# Patient Record
Sex: MALE | URBAN - METROPOLITAN AREA
[De-identification: names, ages, dates, MRNs, and addresses within clinical notes are randomized per-mention and may not be internally consistent; named-entity substitution may affect disease eponyms.]

---

## 2024-07-31 ENCOUNTER — OFFICE VISIT (OUTPATIENT)
Dept: URBAN - METROPOLITAN AREA CLINIC 54 | Facility: CLINIC | Age: 54
End: 2024-07-31

## 2024-07-31 ENCOUNTER — DASHBOARD ENCOUNTERS (OUTPATIENT)
Age: 54
End: 2024-07-31

## 2024-07-31 VITALS
BODY MASS INDEX: 28.36 KG/M2 | WEIGHT: 202.6 LBS | HEART RATE: 85 BPM | DIASTOLIC BLOOD PRESSURE: 79 MMHG | TEMPERATURE: 98.2 F | SYSTOLIC BLOOD PRESSURE: 117 MMHG | HEIGHT: 71 IN

## 2024-07-31 PROBLEM — 64766004: Status: ACTIVE | Noted: 2024-07-31

## 2024-07-31 RX ORDER — PREDNISONE 10 MG/1
TAKE 4 TABS PO X 1 WEEK, TAKE 3 TABS PO X 1 WEEK, TAKE 2 TABS PO X 1 WEEK, TAKE 1 TAB PO X 1 WEEK TABLET ORAL ONCE A DAY
Status: ACTIVE | COMMUNITY

## 2024-07-31 RX ORDER — MIRTAZAPINE 15 MG/1
1 TABLET AT BEDTIME TABLET, FILM COATED ORAL ONCE A DAY
Status: ACTIVE | COMMUNITY

## 2024-07-31 RX ORDER — USTEKINUMAB 130 MG/26ML
AS DIRECTED SOLUTION INTRAVENOUS ONCE
OUTPATIENT
Start: 2024-07-31 | End: 2024-08-01

## 2024-07-31 RX ORDER — FERROUS SULFATE 325(65) MG
1 TABLET TABLET ORAL ONCE A DAY
Status: ACTIVE | COMMUNITY

## 2024-07-31 RX ORDER — OMEPRAZOLE 20 MG/1
1 CAPSULE 30 MINUTES BEFORE MORNING MEAL CAPSULE, DELAYED RELEASE ORAL ONCE A DAY
Status: ACTIVE | COMMUNITY

## 2024-07-31 RX ORDER — USTEKINUMAB 90 MG/ML
PRE-FILLED SYRINGE INJECTION, SOLUTION SUBCUTANEOUS
OUTPATIENT
Start: 2024-07-31

## 2024-07-31 NOTE — HPI-TODAY'S VISIT:
Venkat Oneil is a 53-year-old male patient with PMH of GERD, BPH, UC, who [facility]. A copy of this document will be sent to the referring provider. Patient presents for uncontrolled UC. He was previous seen by [facility] where he failed Entyvio. He started Stelara on 5/2022 and received two maintenance doses until he lost [employer] insurance. He reports he has been trying to get Stelara [facility] with [facility] since. He has been in and out of the ED since Jan 2024 for management of UC with steroids and remains on mesalamine 750 mg BID + budesonide 9mg. He complains of abdominal pain, rectal bleeding, and urgency. Work up during [facility] visit on 7/24 - 7/26 that showed anemia with hgb between 7-8 with iron def s/p IV iron transfusion, thrombocytosis, elevated CRP at 8.30. Negative GI pathogen profile with fecal ramón at >3000. Colonoscopy in 2010 with Dr. Olson for history of UC that noted pancolitis and normal-appearing ileum.  Biopsies showed mild active chronic colitis. Patient believes his last colonoscopy was ?one year ago.  CT A/P on 7/24/24 shows: CT findings of an acute, global, colitis with diffuse pericolonic inflammatory stranding and abnormal bowel wall thickening seen throughout the colon. These findings can be seen with infectious or inflammatory conditions.  Please exclude Clostridium as well as inflammatory bowel disease. No air or bowel wall pneumatosis is seen to suggest ischemia but correlation with lactic acid levels is recommended.  No pericolonic abscess or other complicating features are identified. Gallstones remain in the contracted gallbladder. Hypodensities in the spleen and bilateral kidneys are unchanged from prior and probably reflect benign lesions such as cysts.  This can be correlated with abdominal sonography for assurance. Numerous bilateral nonobstructing subcentimeter renal stones are identified.  No other changes seen.

## 2024-07-31 NOTE — PHYSICAL EXAM HENT:
Head,  normocephalic,  atraumatic,  Face,  Face within normal limits,  Ears,  External ears within normal limits,  Nose/Nasopharynx,  External nose  normal appearance, no nasal discharge,  Mouth and Throat,  Breath odor normal,  Lips,  Appearance normal 127

## 2024-08-22 ENCOUNTER — TELEPHONE ENCOUNTER (OUTPATIENT)
Dept: URBAN - METROPOLITAN AREA CLINIC 6 | Facility: CLINIC | Age: 54
End: 2024-08-22

## 2024-09-11 ENCOUNTER — OFFICE VISIT (OUTPATIENT)
Dept: URBAN - METROPOLITAN AREA CLINIC 54 | Facility: CLINIC | Age: 54
End: 2024-09-11
Payer: OTHER GOVERNMENT

## 2024-09-11 VITALS
BODY MASS INDEX: 28.14 KG/M2 | SYSTOLIC BLOOD PRESSURE: 127 MMHG | HEART RATE: 83 BPM | DIASTOLIC BLOOD PRESSURE: 82 MMHG | WEIGHT: 201 LBS | HEIGHT: 71 IN | TEMPERATURE: 97.2 F

## 2024-09-11 DIAGNOSIS — K51.80 CHRONIC PANCOLONIC ULCERATIVE COLITIS: ICD-10-CM

## 2024-09-11 DIAGNOSIS — Z80.0 FAMILY HISTORY OF COLON CANCER IN FATHER: ICD-10-CM

## 2024-09-11 PROCEDURE — 99214 OFFICE O/P EST MOD 30 MIN: CPT | Performed by: PHYSICIAN ASSISTANT

## 2024-09-11 RX ORDER — PREDNISONE 10 MG/1
TAKE 4 TABS PO X 1 WEEK, TAKE 3 TABS PO X 1 WEEK, TAKE 2 TABS PO X 1 WEEK, TAKE 1 TAB PO X 1 WEEK TABLET ORAL ONCE A DAY
Qty: 70 | Refills: 0
End: 2024-10-11

## 2024-09-11 RX ORDER — MIRTAZAPINE 15 MG/1
1 TABLET AT BEDTIME TABLET, FILM COATED ORAL ONCE A DAY
Status: ACTIVE | COMMUNITY

## 2024-09-11 RX ORDER — CIPROFLOXACIN HYDROCHLORIDE 500 MG/1
1 TABLET TABLET, FILM COATED ORAL
Status: ACTIVE | COMMUNITY

## 2024-09-11 RX ORDER — PREDNISONE 10 MG/1
TAKE 4 TABS PO X 1 WEEK, TAKE 3 TABS PO X 1 WEEK, TAKE 2 TABS PO X 1 WEEK, TAKE 1 TAB PO X 1 WEEK TABLET ORAL ONCE A DAY
Status: ACTIVE | COMMUNITY

## 2024-09-11 RX ORDER — METRONIDAZOLE 500 MG/1
1 TABLET TABLET ORAL THREE TIMES A DAY
Status: ACTIVE | COMMUNITY

## 2024-09-11 RX ORDER — ROPINIROLE HYDROCHLORIDE 1 MG/1
1 TABLET 1 TO 3 HOURS BEFORE BEDTIME TABLET, FILM COATED ORAL ONCE A DAY
Status: ACTIVE | COMMUNITY

## 2024-09-11 RX ORDER — TAMSULOSIN HYDROCHLORIDE 0.4 MG/1
1 CAPSULE CAPSULE ORAL ONCE A DAY
Status: ACTIVE | COMMUNITY

## 2024-09-11 RX ORDER — FERROUS SULFATE 325(65) MG
1 TABLET TABLET ORAL ONCE A DAY
Status: ACTIVE | COMMUNITY

## 2024-09-11 RX ORDER — PREDNISONE 20 MG/1
2 TABLET TABLET ORAL ONCE A DAY
Qty: 60 TABLET | Refills: 0 | OUTPATIENT
Start: 2024-09-11 | End: 2024-10-11

## 2024-09-11 RX ORDER — OMEPRAZOLE 20 MG/1
1 CAPSULE 30 MINUTES BEFORE MORNING MEAL CAPSULE, DELAYED RELEASE ORAL ONCE A DAY
Status: ACTIVE | COMMUNITY

## 2024-09-11 RX ORDER — MESALAMINE 375 MG/1
4 CAPSULES IN THE MORNING CAPSULE, EXTENDED RELEASE ORAL ONCE A DAY
Status: ACTIVE | COMMUNITY

## 2024-09-11 RX ORDER — USTEKINUMAB 90 MG/ML
PRE-FILLED SYRINGE INJECTION, SOLUTION SUBCUTANEOUS
Status: ACTIVE | COMMUNITY
Start: 2024-07-31

## 2024-09-11 NOTE — HPI-TODAY'S VISIT:
Venkat Oneil is a 53-year-old male patient with PMH of GERD, BPH, UC, who has [facility]. A copy of this document will be sent to the referring provider. Patient presents for uncontrolled UC. He was previous seen by [facility] where he failed Entyvio. He started Stelara on 5/2022 and received two maintenance doses until he lost [employer] insurance. He reports he has been trying to get Stelara approved with [facility] since. He has been in and out of the ED since Jan 2024 for management of UC with steroids and remains on mesalamine 750 mg BID + budesonide 9mg. He complains of abdominal pain, rectal bleeding, and urgency. Work up during [facility] visit on 7/24 - 7/26 that showed anemia with hgb between 7-8 with iron def s/p IV iron transfusion, thrombocytosis, elevated CRP at 8.30. Negative GI pathogen profile with fecal ramón at >3000. Colonoscopy in 2010 with Dr. Olson for history of UC that noted pancolitis and normal-appearing ileum.  Biopsies showed mild active chronic colitis. Patient believes his last colonoscopy was ?one year ago.  CT A/P on 7/24/24 shows: CT findings of an acute, global, colitis with diffuse pericolonic inflammatory stranding and abnormal bowel wall thickening seen throughout the colon. These findings can be seen with infectious or inflammatory conditions.  Please exclude Clostridium as well as inflammatory bowel disease. No air or bowel wall pneumatosis is seen to suggest ischemiabut correlation with lactic acid levels is recommended.  No pericolonic abscess or other complicating features are identified. Gallstones remain in the contracted gallbladder. Hypodensities in the spleen and bilateral kidneys are unchanged from prior and probably reflect benign lesions such as cysts.  This can be correlated with abdominal sonography for assurance. Numerous bilateral nonobstructing subcentimeter renal stones are identified.  No other changes seen.  9/11/24: Patient presents for hospital follow up. Patient was seen in the ED 3 days ago at [facility] for RUQ pain. CT A/P on 9/9 that shows: 1. Findings most consistent with acute exacerbation of UC 2.Nonobstructing bilateral nephroliths. 3. Spleen contains soft tissue nodule with cystic center at the right margin of the spleen measuring 5.2 x 5 x 5.5 cm.  Finding is unchanged when compared to the exam from May 13, 2024.  Finding likely represents a benign etiology in the absence of known primary malignancy.  Recommend follow-up imaging to ensure stability in 6 months. 4. Cholelithiasis with no imaging findings of acute cholecystitis.   Pt was given 7 days of prednisone as well as cipro + flagyl. Normal WBC and procalcitonin. We [facility] to [facility] last OV, but this was denied. Attempted to call the [facility] during the [facility] and was on the phone for 22 mins and the line was disconnected.

## 2024-09-23 ENCOUNTER — TELEPHONE ENCOUNTER (OUTPATIENT)
Dept: RURAL CLINIC 2 | Facility: CLINIC | Age: 54
End: 2024-09-23

## 2024-09-26 ENCOUNTER — TELEPHONE ENCOUNTER (OUTPATIENT)
Dept: URBAN - METROPOLITAN AREA CLINIC 54 | Facility: CLINIC | Age: 54
End: 2024-09-26

## 2024-09-26 RX ORDER — INFLIXIMAB 100 MG/10ML
AS DIRECTED INJECTION, POWDER, LYOPHILIZED, FOR SOLUTION INTRAVENOUS
Qty: 1 | Refills: 6 | OUTPATIENT
Start: 2024-09-26 | End: 2025-10-23

## 2024-09-26 RX ORDER — INFLIXIMAB 100 MG/10ML
AS DIRECTED INJECTION, POWDER, LYOPHILIZED, FOR SOLUTION INTRAVENOUS
OUTPATIENT
Start: 2024-09-26

## 2024-10-03 ENCOUNTER — TELEPHONE ENCOUNTER (OUTPATIENT)
Dept: URBAN - METROPOLITAN AREA CLINIC 54 | Facility: CLINIC | Age: 54
End: 2024-10-03

## 2024-10-14 ENCOUNTER — TELEPHONE ENCOUNTER (OUTPATIENT)
Dept: URBAN - METROPOLITAN AREA CLINIC 54 | Facility: CLINIC | Age: 54
End: 2024-10-14

## 2024-10-16 ENCOUNTER — OFFICE VISIT (OUTPATIENT)
Dept: URBAN - METROPOLITAN AREA CLINIC 54 | Facility: CLINIC | Age: 54
End: 2024-10-16
Payer: OTHER GOVERNMENT

## 2024-10-16 VITALS
SYSTOLIC BLOOD PRESSURE: 126 MMHG | DIASTOLIC BLOOD PRESSURE: 73 MMHG | HEIGHT: 71 IN | WEIGHT: 206.8 LBS | BODY MASS INDEX: 28.95 KG/M2 | HEART RATE: 88 BPM | TEMPERATURE: 98.4 F

## 2024-10-16 DIAGNOSIS — Z80.0 FAMILY HISTORY OF COLON CANCER IN FATHER: ICD-10-CM

## 2024-10-16 DIAGNOSIS — K51.90 ULCERATIVE COLITIS WITHOUT COMPLICATIONS, UNSPECIFIED LOCATION: ICD-10-CM

## 2024-10-16 PROCEDURE — 99213 OFFICE O/P EST LOW 20 MIN: CPT | Performed by: PHYSICIAN ASSISTANT

## 2024-10-16 RX ORDER — MIRTAZAPINE 15 MG/1
1 TABLET AT BEDTIME TABLET, FILM COATED ORAL ONCE A DAY
Status: ACTIVE | COMMUNITY

## 2024-10-16 RX ORDER — MESALAMINE 375 MG/1
4 CAPSULES IN THE MORNING CAPSULE, EXTENDED RELEASE ORAL ONCE A DAY
Status: ACTIVE | COMMUNITY

## 2024-10-16 RX ORDER — ROPINIROLE HYDROCHLORIDE 1 MG/1
1 TABLET 1 TO 3 HOURS BEFORE BEDTIME TABLET, FILM COATED ORAL ONCE A DAY
Status: ACTIVE | COMMUNITY

## 2024-10-16 RX ORDER — CIPROFLOXACIN HYDROCHLORIDE 500 MG/1
1 TABLET TABLET, FILM COATED ORAL
Status: ACTIVE | COMMUNITY

## 2024-10-16 RX ORDER — TAMSULOSIN HYDROCHLORIDE 0.4 MG/1
1 CAPSULE CAPSULE ORAL ONCE A DAY
Status: ACTIVE | COMMUNITY

## 2024-10-16 RX ORDER — FERROUS SULFATE 325(65) MG
1 TABLET TABLET ORAL ONCE A DAY
Status: ACTIVE | COMMUNITY

## 2024-10-16 RX ORDER — OMEPRAZOLE 20 MG/1
1 CAPSULE 30 MINUTES BEFORE MORNING MEAL CAPSULE, DELAYED RELEASE ORAL ONCE A DAY
Status: ACTIVE | COMMUNITY

## 2024-10-16 RX ORDER — METRONIDAZOLE 500 MG/1
1 TABLET TABLET ORAL THREE TIMES A DAY
Status: ACTIVE | COMMUNITY

## 2024-10-16 RX ORDER — INFLIXIMAB 100 MG/10ML
AS DIRECTED INJECTION, POWDER, LYOPHILIZED, FOR SOLUTION INTRAVENOUS
Status: ACTIVE | COMMUNITY
Start: 2024-09-26

## 2024-10-16 RX ORDER — INFLIXIMAB 100 MG/10ML
AS DIRECTED INJECTION, POWDER, LYOPHILIZED, FOR SOLUTION INTRAVENOUS
Qty: 1 | Refills: 6 | Status: ACTIVE | COMMUNITY
Start: 2024-09-26 | End: 2025-10-23

## 2024-10-16 RX ORDER — USTEKINUMAB 90 MG/ML
PRE-FILLED SYRINGE INJECTION, SOLUTION SUBCUTANEOUS
Status: ACTIVE | COMMUNITY
Start: 2024-07-31

## 2024-10-16 NOTE — HPI-TODAY'S VISIT:
Venkat Oneil is a 53-year-old male patient with PMH of GERD, BPH, UC, who has [facility]. A copy of this document will be sent to the referring provider. Patient presents for uncontrolled UC. He was previous seen by [facility] where he failed Entyvio. He started Stelara on 5/2022 and received two maintenance doses until he lost [employer] insurance. He reports he has been trying to get Stelara approved with [facility] since. He has been in and out of the ED since Jan 2024 for management of UC with steroids and remains on mesalamine 750 mg BID + budesonide 9mg. He complains of abdominal pain, rectal bleeding, and urgency. Work up during [facility] visit on 7/24 - 7/26 that showed anemia with hgb between 7-8 with iron def s/p IV iron transfusion, thrombocytosis, elevated CRP at 8.30. Negative GI pathogen profile with fecal ramón at >3000. Colonoscopy in 2010 with Dr. Olson for history of UC that noted pancolitis and normal-appearing ileum.  Biopsies showed mild active chronic colitis. Patient believes his last colonoscopy was ?one year ago.  CT A/P on 7/24/24 shows: CT findings of an acute, global, colitis with diffuse pericolonic inflammatory stranding and abnormal bowel wall thickening seen throughout the colon. These findings can be seen with infectious or inflammatory conditions.  Please exclude Clostridium as well as inflammatory bowel disease. No air or bowel wall pneumatosis is seen to suggest ischemiabut correlation with lactic acid levels is recommended.  No pericolonic abscess or other complicating features are identified. Gallstones remain in the contracted gallbladder. Hypodensities in the spleen and bilateral kidneys are unchanged from prior and probably reflect benign lesions such as cysts.  This can be correlated with abdominal sonography for assurance. Numerous bilateral nonobstructing subcentimeter renal stones are identified.  No other changes seen.  9/11/24: Patient presents for hospital follow up. Patient was seen in the ED 3 days ago at [facility] for RUQ pain. CT A/P on 9/9 that shows: 1. Findings most consistent with acute exacerbation of UC 2.Nonobstructing bilateral nephroliths. 3. Spleen contains soft tissue nodule with cystic center at the right margin of the spleen measuring 5.2 x 5 x 5.5 cm.  Finding is unchanged when compared to the exam from May 13, 2024.  Finding likely represents a benign etiology in the absence of known primary malignancy.  Recommend follow-up imaging to ensure stability in 6 months. 4. Cholelithiasis with no imaging findings of acute cholecystitis.   Pt was given 7 days of prednisone as well as cipro + flagyl. Normal WBC and procalcitonin. We attempted to send Stelara ourselves last OV, but this was denied. [facility]ed to call the [facility] during the [facility] and was on the phone for 22 mins and the line was disconnected.  10/16/24: Pt presents for routine follow up. Unable to get Stelara. Waiting for approval of Remicade and to start infusion. Tapering down on Prednisone. 3-4 BMs daily on average which was improved approx 6 BMs daily. Daily rectal bleeding. Good improvement in abdominal pain.

## 2024-11-20 ENCOUNTER — OFFICE VISIT (OUTPATIENT)
Dept: URBAN - METROPOLITAN AREA CLINIC 54 | Facility: CLINIC | Age: 54
End: 2024-11-20

## 2024-11-20 ENCOUNTER — TELEPHONE ENCOUNTER (OUTPATIENT)
Dept: URBAN - METROPOLITAN AREA CLINIC 82 | Facility: CLINIC | Age: 54
End: 2024-11-20

## 2024-11-20 RX ORDER — OMEPRAZOLE 20 MG/1
1 CAPSULE 30 MINUTES BEFORE MORNING MEAL CAPSULE, DELAYED RELEASE ORAL ONCE A DAY
Status: ACTIVE | COMMUNITY

## 2024-11-20 RX ORDER — INFLIXIMAB 100 MG/10ML
AS DIRECTED INJECTION, POWDER, LYOPHILIZED, FOR SOLUTION INTRAVENOUS
Qty: 1 | Refills: 6 | Status: ACTIVE | COMMUNITY
Start: 2024-09-26 | End: 2025-10-23

## 2024-11-20 RX ORDER — ROPINIROLE HYDROCHLORIDE 1 MG/1
1 TABLET 1 TO 3 HOURS BEFORE BEDTIME TABLET, FILM COATED ORAL ONCE A DAY
Status: ACTIVE | COMMUNITY

## 2024-11-20 RX ORDER — PREDNISONE 20 MG/1
1 TABLET TABLET ORAL TWICE DAILY
Status: ACTIVE | COMMUNITY

## 2024-11-20 RX ORDER — INFLIXIMAB 100 MG/10ML
AS DIRECTED INJECTION, POWDER, LYOPHILIZED, FOR SOLUTION INTRAVENOUS
Status: ACTIVE | COMMUNITY
Start: 2024-09-26

## 2024-11-20 RX ORDER — MIRTAZAPINE 15 MG/1
1 TABLET AT BEDTIME TABLET, FILM COATED ORAL ONCE A DAY
Status: ACTIVE | COMMUNITY

## 2024-11-20 RX ORDER — USTEKINUMAB 90 MG/ML
PRE-FILLED SYRINGE INJECTION, SOLUTION SUBCUTANEOUS
Status: ACTIVE | COMMUNITY
Start: 2024-07-31

## 2024-11-20 RX ORDER — METRONIDAZOLE 500 MG/1
1 TABLET TABLET ORAL THREE TIMES A DAY
Status: ACTIVE | COMMUNITY

## 2024-11-20 RX ORDER — CIPROFLOXACIN HYDROCHLORIDE 500 MG/1
1 TABLET TABLET, FILM COATED ORAL
Status: ACTIVE | COMMUNITY

## 2024-11-20 RX ORDER — TAMSULOSIN HYDROCHLORIDE 0.4 MG/1
1 CAPSULE CAPSULE ORAL ONCE A DAY
Status: ACTIVE | COMMUNITY

## 2024-11-20 RX ORDER — MESALAMINE 375 MG/1
4 CAPSULES IN THE MORNING CAPSULE, EXTENDED RELEASE ORAL ONCE A DAY
Status: ACTIVE | COMMUNITY

## 2024-11-20 RX ORDER — FERROUS SULFATE 325(65) MG
1 TABLET TABLET ORAL ONCE A DAY
Status: ACTIVE | COMMUNITY

## 2024-11-26 ENCOUNTER — TELEPHONE ENCOUNTER (OUTPATIENT)
Dept: URBAN - METROPOLITAN AREA CLINIC 82 | Facility: CLINIC | Age: 54
End: 2024-11-26

## 2024-11-26 ENCOUNTER — TELEPHONE ENCOUNTER (OUTPATIENT)
Dept: URBAN - METROPOLITAN AREA CLINIC 6 | Facility: CLINIC | Age: 54
End: 2024-11-26

## 2024-11-27 ENCOUNTER — OFFICE VISIT (OUTPATIENT)
Dept: URBAN - METROPOLITAN AREA CLINIC 54 | Facility: CLINIC | Age: 54
End: 2024-11-27
Payer: OTHER GOVERNMENT

## 2024-11-27 VITALS
HEIGHT: 71 IN | SYSTOLIC BLOOD PRESSURE: 138 MMHG | WEIGHT: 198.4 LBS | TEMPERATURE: 97.9 F | HEART RATE: 93 BPM | DIASTOLIC BLOOD PRESSURE: 91 MMHG | BODY MASS INDEX: 27.77 KG/M2

## 2024-11-27 DIAGNOSIS — K51.90 ULCERATIVE COLITIS WITHOUT COMPLICATIONS, UNSPECIFIED LOCATION: ICD-10-CM

## 2024-11-27 DIAGNOSIS — Z80.0 FAMILY HISTORY OF COLON CANCER IN FATHER: ICD-10-CM

## 2024-11-27 PROCEDURE — 99214 OFFICE O/P EST MOD 30 MIN: CPT | Performed by: PHYSICIAN ASSISTANT

## 2024-11-27 RX ORDER — L. ACIDOPHILUS/L.BULGARICUS 1MM CELL
AS DIRECTED TABLET ORAL
Status: ACTIVE | COMMUNITY

## 2024-11-27 RX ORDER — OXYCODONE HYDROCHLORIDE AND ACETAMINOPHEN 5; 325 MG/1; MG/1
1 TABLET AS NEEDED TABLET ORAL
Status: ACTIVE | COMMUNITY

## 2024-11-27 RX ORDER — INFLIXIMAB 100 MG/10ML
AS DIRECTED INJECTION, POWDER, LYOPHILIZED, FOR SOLUTION INTRAVENOUS
Qty: 1 | Refills: 6 | Status: ACTIVE | COMMUNITY
Start: 2024-09-26 | End: 2025-10-23

## 2024-11-27 RX ORDER — PREDNISONE 20 MG/1
1 TABLET TABLET ORAL TWICE DAILY
Status: ACTIVE | COMMUNITY

## 2024-11-27 RX ORDER — OMEPRAZOLE AND SODIUM BICARBONATE 40; 1100 MG/1; MG/1
1 CAPSULE ON AN EMPTY STOMACH CAPSULE ORAL ONCE A DAY
Status: ACTIVE | COMMUNITY

## 2024-11-27 RX ORDER — TAMSULOSIN HYDROCHLORIDE 0.4 MG/1
1 CAPSULE CAPSULE ORAL ONCE A DAY
Status: ACTIVE | COMMUNITY

## 2024-11-27 RX ORDER — INFLIXIMAB 100 MG/10ML
AS DIRECTED INJECTION, POWDER, LYOPHILIZED, FOR SOLUTION INTRAVENOUS
Status: ACTIVE | COMMUNITY
Start: 2024-09-26

## 2024-11-27 RX ORDER — ONDANSETRON 4 MG/1
1 TABLET ON THE TONGUE AND ALLOW TO DISSOLVE TABLET, ORALLY DISINTEGRATING ORAL ONCE A DAY
Status: ACTIVE | COMMUNITY

## 2024-11-27 RX ORDER — PREDNISONE 10 MG/1
TAKE 4 TABS PO X 1 WEEK, TAKE 3 TABS PO X 1 WEEK, TAKE 2 TABS PO X 1 WEEK, TAKE 1 TAB PO X 1 WEEK TABLET ORAL ONCE A DAY
Qty: 70 | Refills: 0
End: 2024-12-27

## 2024-11-27 RX ORDER — FERROUS SULFATE 325(65) MG
1 TABLET TABLET ORAL ONCE A DAY
Status: ACTIVE | COMMUNITY

## 2024-11-27 RX ORDER — MESALAMINE 375 MG/1
4 CAPSULES IN THE MORNING CAPSULE, EXTENDED RELEASE ORAL ONCE A DAY
Status: ACTIVE | COMMUNITY

## 2024-11-27 RX ORDER — MIRTAZAPINE 15 MG/1
1 TABLET AT BEDTIME TABLET, FILM COATED ORAL ONCE A DAY
Status: ACTIVE | COMMUNITY

## 2024-11-27 RX ORDER — ROPINIROLE HYDROCHLORIDE 1 MG/1
1 TABLET 1 TO 3 HOURS BEFORE BEDTIME TABLET, FILM COATED ORAL ONCE A DAY
Status: ACTIVE | COMMUNITY

## 2024-11-27 RX ORDER — POTASSIUM CITRATE 10 MEQ/1
1 TABLET WITH MEALS TABLET, EXTENDED RELEASE ORAL THREE TIMES A DAY
Status: ACTIVE | COMMUNITY

## 2024-11-27 RX ORDER — PREDNISONE 20 MG/1
2 TABLET TABLET ORAL ONCE A DAY
Qty: 60 TABLET | Refills: 0
Start: 2024-09-11 | End: 2024-12-27

## 2024-11-27 NOTE — HPI-TODAY'S VISIT:
Venkat Oneil is a 53-year-old male patient with PMH of GERD, BPH, UC, who has [facility]. A copy of this document will be sent to the referring provider. Patient presents for uncontrolled UC. He was previous seen by [facility] where he failed Entyvio. He started Stelara on 5/2022 and received two maintenance doses until he lost [employer] insurance. He reports he has been trying to get Stelara approved with the [facility] since. He has been in and out of the ED since Jan 2024 for management of UC with steroids and remains on mesalamine 750 mg BID + budesonide 9mg. He complains of abdominal pain, rectal bleeding, and urgency. Work up during [facility] visit on 7/24 - 7/26 that showed anemia with hgb between 7-8 with iron def s/p IV iron transfusion, thrombocytosis, elevated CRP at 8.30. Negative GI pathogen profile with fecal ramón at >3000. Colonoscopy in 2010 with Dr. Olson for history of UC that noted pancolitis and normal-appearing ileum.  Biopsies showed mild active chronic colitis. Patient believes his last colonoscopy was ?one year ago.  CT A/P on 7/24/24 shows: CT findings of an acute, global, colitis with diffuse pericolonic inflammatory stranding and abnormal bowel wall thickening seen throughout the colon. These findings can be seen with infectious or inflammatory conditions.  Please exclude Clostridium as well as inflammatory bowel disease. No air or bowel wall pneumatosis is seen to suggest ischemiabut correlation with lactic acid levels is recommended.  No pericolonic abscess or other complicating features are identified. Gallstones remain in the contracted gallbladder. Hypodensities in the spleen and bilateral kidneys are unchanged from prior and probably reflect benign lesions such as cysts.  This can be correlated with abdominal sonography for assurance. Numerous bilateral nonobstructing subcentimeter renal stones are identified.  No other changes seen.  9/11/24: Patient presents for hospital follow up. Patient was seen in the ED 3 days ago at [facility] for RUQ pain. CT A/P on 9/9 that shows: 1. Findings most consistent with acute exacerbation of UC 2.Nonobstructing bilateral nephroliths. 3. Spleen contains soft tissue nodule with cystic center at the right margin of the spleen measuring 5.2 x 5 x 5.5 cm.  Finding is unchanged when compared to the exam from May 13, 2024.  Finding likely represents a benign etiology in the absence of known primary malignancy.  Recommend follow-up imaging to ensure stability in 6 months. 4. Cholelithiasis with no imaging findings of acute cholecystitis.   Pt was given 7 days of prednisone as well as cipro + flagyl. Normal WBC and procalcitonin. We attempted to send Stelara ourselves last OV, but this was denied. Attempted to call the [facility] during the [facility] and was on the phone for 22 mins and the line was disconnected.  10/16/24: Pt presents for routine follow up. Unable to get Stelara. Waiting for approval of Remicade and to start infusion. Tapering down on Prednisone. 3-4 BMs daily on average which was improved approx 6 BMs daily. Daily rectal bleeding. Good improvement in abdominal pain.  11/27/24: Patient presents for routine follow-up of ulcerative colitis.  Patient has tapered off of steroids and was seen in the ED twice.  Once for recurrent kidney stone and the second time was for UC flare.  He is currently on a 10-day course of prednisone.  Mild bleeding noted.  He has been approved for Remicade and there have been difficulty scheduling the patient.

## 2024-12-12 ENCOUNTER — OFFICE VISIT (OUTPATIENT)
Dept: URBAN - METROPOLITAN AREA CLINIC 53 | Facility: CLINIC | Age: 54
End: 2024-12-12

## 2024-12-18 ENCOUNTER — OFFICE VISIT (OUTPATIENT)
Dept: URBAN - METROPOLITAN AREA CLINIC 54 | Facility: CLINIC | Age: 54
End: 2024-12-18

## 2024-12-18 RX ORDER — MESALAMINE 375 MG/1
4 CAPSULES IN THE MORNING CAPSULE, EXTENDED RELEASE ORAL ONCE A DAY
Status: ACTIVE | COMMUNITY

## 2024-12-18 RX ORDER — ROPINIROLE HYDROCHLORIDE 1 MG/1
1 TABLET 1 TO 3 HOURS BEFORE BEDTIME TABLET, FILM COATED ORAL ONCE A DAY
Status: ACTIVE | COMMUNITY

## 2024-12-18 RX ORDER — INFLIXIMAB 100 MG/10ML
AS DIRECTED INJECTION, POWDER, LYOPHILIZED, FOR SOLUTION INTRAVENOUS
Status: ACTIVE | COMMUNITY
Start: 2024-09-26

## 2024-12-18 RX ORDER — OMEPRAZOLE AND SODIUM BICARBONATE 40; 1100 MG/1; MG/1
1 CAPSULE ON AN EMPTY STOMACH CAPSULE ORAL ONCE A DAY
Status: ACTIVE | COMMUNITY

## 2024-12-18 RX ORDER — MIRTAZAPINE 15 MG/1
1 TABLET AT BEDTIME TABLET, FILM COATED ORAL ONCE A DAY
Status: ACTIVE | COMMUNITY

## 2024-12-18 RX ORDER — PREDNISONE 20 MG/1
1 TABLET TABLET ORAL TWICE DAILY
Status: ACTIVE | COMMUNITY

## 2024-12-18 RX ORDER — L. ACIDOPHILUS/L.BULGARICUS 1MM CELL
AS DIRECTED TABLET ORAL
Status: ACTIVE | COMMUNITY

## 2024-12-18 RX ORDER — OXYCODONE HYDROCHLORIDE AND ACETAMINOPHEN 5; 325 MG/1; MG/1
1 TABLET AS NEEDED TABLET ORAL
Status: ACTIVE | COMMUNITY

## 2024-12-18 RX ORDER — FERROUS SULFATE 325(65) MG
1 TABLET TABLET ORAL ONCE A DAY
Status: ACTIVE | COMMUNITY

## 2024-12-18 RX ORDER — PREDNISONE 10 MG/1
TAKE 4 TABS PO X 1 WEEK, TAKE 3 TABS PO X 1 WEEK, TAKE 2 TABS PO X 1 WEEK, TAKE 1 TAB PO X 1 WEEK TABLET ORAL ONCE A DAY
Qty: 70 | Refills: 0 | Status: ACTIVE | COMMUNITY
End: 2024-12-27

## 2024-12-18 RX ORDER — POTASSIUM CITRATE 10 MEQ/1
1 TABLET WITH MEALS TABLET, EXTENDED RELEASE ORAL THREE TIMES A DAY
Status: ACTIVE | COMMUNITY

## 2024-12-18 RX ORDER — TAMSULOSIN HYDROCHLORIDE 0.4 MG/1
1 CAPSULE CAPSULE ORAL ONCE A DAY
Status: ACTIVE | COMMUNITY

## 2024-12-18 RX ORDER — INFLIXIMAB 100 MG/10ML
AS DIRECTED INJECTION, POWDER, LYOPHILIZED, FOR SOLUTION INTRAVENOUS
Qty: 1 | Refills: 6 | Status: ACTIVE | COMMUNITY
Start: 2024-09-26 | End: 2025-10-23

## 2024-12-18 RX ORDER — ONDANSETRON 4 MG/1
1 TABLET ON THE TONGUE AND ALLOW TO DISSOLVE TABLET, ORALLY DISINTEGRATING ORAL ONCE A DAY
Status: ACTIVE | COMMUNITY

## 2024-12-18 RX ORDER — PREDNISONE 20 MG/1
2 TABLET TABLET ORAL ONCE A DAY
Qty: 60 TABLET | Refills: 0 | Status: ACTIVE | COMMUNITY
Start: 2024-09-11 | End: 2024-12-27

## 2025-01-16 ENCOUNTER — OFFICE VISIT (OUTPATIENT)
Dept: URBAN - METROPOLITAN AREA CLINIC 53 | Facility: CLINIC | Age: 55
End: 2025-01-16
Payer: OTHER GOVERNMENT

## 2025-01-16 VITALS
TEMPERATURE: 97.2 F | DIASTOLIC BLOOD PRESSURE: 78 MMHG | BODY MASS INDEX: 31.58 KG/M2 | SYSTOLIC BLOOD PRESSURE: 144 MMHG | WEIGHT: 225.6 LBS | HEIGHT: 71 IN

## 2025-01-16 DIAGNOSIS — K51.90 ACUTE ULCERATIVE COLITIS: ICD-10-CM

## 2025-01-16 PROCEDURE — 96413 CHEMO IV INFUSION 1 HR: CPT | Performed by: INTERNAL MEDICINE

## 2025-01-16 PROCEDURE — 96415 CHEMO IV INFUSION ADDL HR: CPT | Performed by: INTERNAL MEDICINE

## 2025-01-16 RX ORDER — FERROUS SULFATE 325(65) MG
1 TABLET TABLET ORAL ONCE A DAY
Status: ACTIVE | COMMUNITY

## 2025-01-16 RX ORDER — MESALAMINE 375 MG/1
4 CAPSULES IN THE MORNING CAPSULE, EXTENDED RELEASE ORAL ONCE A DAY
Status: ACTIVE | COMMUNITY

## 2025-01-16 RX ORDER — OXYCODONE HYDROCHLORIDE AND ACETAMINOPHEN 5; 325 MG/1; MG/1
1 TABLET AS NEEDED TABLET ORAL
Status: ACTIVE | COMMUNITY

## 2025-01-16 RX ORDER — L. ACIDOPHILUS/L.BULGARICUS 1MM CELL
AS DIRECTED TABLET ORAL
Status: ACTIVE | COMMUNITY

## 2025-01-16 RX ORDER — TAMSULOSIN HYDROCHLORIDE 0.4 MG/1
1 CAPSULE CAPSULE ORAL ONCE A DAY
Status: ACTIVE | COMMUNITY

## 2025-01-16 RX ORDER — ONDANSETRON 4 MG/1
1 TABLET ON THE TONGUE AND ALLOW TO DISSOLVE TABLET, ORALLY DISINTEGRATING ORAL ONCE A DAY
Status: ACTIVE | COMMUNITY

## 2025-01-16 RX ORDER — ROPINIROLE HYDROCHLORIDE 1 MG/1
1 TABLET 1 TO 3 HOURS BEFORE BEDTIME TABLET, FILM COATED ORAL ONCE A DAY
Status: ACTIVE | COMMUNITY

## 2025-01-16 RX ORDER — POTASSIUM CITRATE 10 MEQ/1
1 TABLET WITH MEALS TABLET, EXTENDED RELEASE ORAL THREE TIMES A DAY
Status: ACTIVE | COMMUNITY

## 2025-01-16 RX ORDER — INFLIXIMAB 100 MG/10ML
AS DIRECTED INJECTION, POWDER, LYOPHILIZED, FOR SOLUTION INTRAVENOUS
Status: ACTIVE | COMMUNITY
Start: 2024-09-26

## 2025-01-16 RX ORDER — OMEPRAZOLE AND SODIUM BICARBONATE 40; 1100 MG/1; MG/1
1 CAPSULE ON AN EMPTY STOMACH CAPSULE ORAL ONCE A DAY
Status: ACTIVE | COMMUNITY

## 2025-01-16 RX ORDER — PREDNISONE 20 MG/1
1 TABLET TABLET ORAL TWICE DAILY
Status: ACTIVE | COMMUNITY

## 2025-01-16 RX ORDER — INFLIXIMAB 100 MG/10ML
AS DIRECTED INJECTION, POWDER, LYOPHILIZED, FOR SOLUTION INTRAVENOUS
Qty: 1 | Refills: 6 | Status: ACTIVE | COMMUNITY
Start: 2024-09-26 | End: 2025-10-23

## 2025-01-16 RX ORDER — MIRTAZAPINE 15 MG/1
1 TABLET AT BEDTIME TABLET, FILM COATED ORAL ONCE A DAY
Status: ACTIVE | COMMUNITY

## 2025-01-30 ENCOUNTER — OFFICE VISIT (OUTPATIENT)
Dept: URBAN - METROPOLITAN AREA CLINIC 53 | Facility: CLINIC | Age: 55
End: 2025-01-30
Payer: OTHER GOVERNMENT

## 2025-01-30 VITALS
DIASTOLIC BLOOD PRESSURE: 82 MMHG | WEIGHT: 220.6 LBS | BODY MASS INDEX: 30.88 KG/M2 | HEIGHT: 71 IN | SYSTOLIC BLOOD PRESSURE: 139 MMHG | TEMPERATURE: 98.1 F

## 2025-01-30 DIAGNOSIS — K51.90 ULCERATIVE COLITIS WITHOUT COMPLICATIONS, UNSPECIFIED LOCATION: ICD-10-CM

## 2025-01-30 PROCEDURE — 96413 CHEMO IV INFUSION 1 HR: CPT | Performed by: INTERNAL MEDICINE

## 2025-01-30 PROCEDURE — 96415 CHEMO IV INFUSION ADDL HR: CPT | Performed by: INTERNAL MEDICINE

## 2025-01-30 RX ORDER — OMEPRAZOLE AND SODIUM BICARBONATE 40; 1100 MG/1; MG/1
1 CAPSULE ON AN EMPTY STOMACH CAPSULE ORAL ONCE A DAY
Status: ACTIVE | COMMUNITY

## 2025-01-30 RX ORDER — MIRTAZAPINE 15 MG/1
1 TABLET AT BEDTIME TABLET, FILM COATED ORAL ONCE A DAY
Status: ACTIVE | COMMUNITY

## 2025-01-30 RX ORDER — ROPINIROLE HYDROCHLORIDE 1 MG/1
1 TABLET 1 TO 3 HOURS BEFORE BEDTIME TABLET, FILM COATED ORAL ONCE A DAY
Status: ACTIVE | COMMUNITY

## 2025-01-30 RX ORDER — OXYCODONE HYDROCHLORIDE AND ACETAMINOPHEN 5; 325 MG/1; MG/1
1 TABLET AS NEEDED TABLET ORAL
Status: ACTIVE | COMMUNITY

## 2025-01-30 RX ORDER — MESALAMINE 375 MG/1
4 CAPSULES IN THE MORNING CAPSULE, EXTENDED RELEASE ORAL ONCE A DAY
Status: ACTIVE | COMMUNITY

## 2025-01-30 RX ORDER — INFLIXIMAB 100 MG/10ML
AS DIRECTED INJECTION, POWDER, LYOPHILIZED, FOR SOLUTION INTRAVENOUS
Status: ACTIVE | COMMUNITY
Start: 2024-09-26

## 2025-01-30 RX ORDER — FERROUS SULFATE 325(65) MG
1 TABLET TABLET ORAL ONCE A DAY
Status: ACTIVE | COMMUNITY

## 2025-01-30 RX ORDER — ONDANSETRON 4 MG/1
1 TABLET ON THE TONGUE AND ALLOW TO DISSOLVE TABLET, ORALLY DISINTEGRATING ORAL ONCE A DAY
Status: ACTIVE | COMMUNITY

## 2025-01-30 RX ORDER — PREDNISONE 20 MG/1
1 TABLET TABLET ORAL TWICE DAILY
Status: ACTIVE | COMMUNITY

## 2025-01-30 RX ORDER — TAMSULOSIN HYDROCHLORIDE 0.4 MG/1
1 CAPSULE CAPSULE ORAL ONCE A DAY
Status: ACTIVE | COMMUNITY

## 2025-01-30 RX ORDER — L. ACIDOPHILUS/L.BULGARICUS 1MM CELL
AS DIRECTED TABLET ORAL
Status: ACTIVE | COMMUNITY

## 2025-01-30 RX ORDER — POTASSIUM CITRATE 10 MEQ/1
1 TABLET WITH MEALS TABLET, EXTENDED RELEASE ORAL THREE TIMES A DAY
Status: ACTIVE | COMMUNITY

## 2025-01-30 RX ORDER — INFLIXIMAB 100 MG/10ML
AS DIRECTED INJECTION, POWDER, LYOPHILIZED, FOR SOLUTION INTRAVENOUS
Qty: 1 | Refills: 6 | Status: ACTIVE | COMMUNITY
Start: 2024-09-26 | End: 2025-10-23

## 2025-02-27 ENCOUNTER — OFFICE VISIT (OUTPATIENT)
Dept: URBAN - METROPOLITAN AREA CLINIC 53 | Facility: CLINIC | Age: 55
End: 2025-02-27
Payer: OTHER GOVERNMENT

## 2025-02-27 VITALS
HEART RATE: 92 BPM | RESPIRATION RATE: 20 BRPM | DIASTOLIC BLOOD PRESSURE: 80 MMHG | SYSTOLIC BLOOD PRESSURE: 158 MMHG | WEIGHT: 220.4 LBS | TEMPERATURE: 97.9 F | BODY MASS INDEX: 30.85 KG/M2 | HEIGHT: 71 IN

## 2025-02-27 DIAGNOSIS — K51.90 ULCERATIVE COLITIS WITHOUT COMPLICATIONS, UNSPECIFIED LOCATION: ICD-10-CM

## 2025-02-27 PROCEDURE — 96413 CHEMO IV INFUSION 1 HR: CPT | Performed by: INTERNAL MEDICINE

## 2025-02-27 PROCEDURE — 96415 CHEMO IV INFUSION ADDL HR: CPT | Performed by: INTERNAL MEDICINE

## 2025-02-27 RX ORDER — ROPINIROLE HYDROCHLORIDE 1 MG/1
1 TABLET 1 TO 3 HOURS BEFORE BEDTIME TABLET, FILM COATED ORAL ONCE A DAY
Status: ACTIVE | COMMUNITY

## 2025-02-27 RX ORDER — OMEPRAZOLE AND SODIUM BICARBONATE 40; 1100 MG/1; MG/1
1 CAPSULE ON AN EMPTY STOMACH CAPSULE ORAL ONCE A DAY
Status: ACTIVE | COMMUNITY

## 2025-02-27 RX ORDER — OXYCODONE HYDROCHLORIDE AND ACETAMINOPHEN 5; 325 MG/1; MG/1
1 TABLET AS NEEDED TABLET ORAL
Status: ACTIVE | COMMUNITY

## 2025-02-27 RX ORDER — ONDANSETRON 4 MG/1
1 TABLET ON THE TONGUE AND ALLOW TO DISSOLVE TABLET, ORALLY DISINTEGRATING ORAL ONCE A DAY
Status: ACTIVE | COMMUNITY

## 2025-02-27 RX ORDER — L. ACIDOPHILUS/L.BULGARICUS 1MM CELL
AS DIRECTED TABLET ORAL
Status: ACTIVE | COMMUNITY

## 2025-02-27 RX ORDER — MESALAMINE 375 MG/1
4 CAPSULES IN THE MORNING CAPSULE, EXTENDED RELEASE ORAL ONCE A DAY
Status: ACTIVE | COMMUNITY

## 2025-02-27 RX ORDER — POTASSIUM CITRATE 10 MEQ/1
1 TABLET WITH MEALS TABLET, EXTENDED RELEASE ORAL THREE TIMES A DAY
Status: ACTIVE | COMMUNITY

## 2025-02-27 RX ORDER — PREDNISONE 20 MG/1
1 TABLET TABLET ORAL TWICE DAILY
Status: ACTIVE | COMMUNITY

## 2025-02-27 RX ORDER — FERROUS SULFATE 325(65) MG
1 TABLET TABLET ORAL ONCE A DAY
Status: ACTIVE | COMMUNITY

## 2025-02-27 RX ORDER — TAMSULOSIN HYDROCHLORIDE 0.4 MG/1
1 CAPSULE CAPSULE ORAL ONCE A DAY
Status: ACTIVE | COMMUNITY

## 2025-02-27 RX ORDER — MIRTAZAPINE 15 MG/1
1 TABLET AT BEDTIME TABLET, FILM COATED ORAL ONCE A DAY
Status: ACTIVE | COMMUNITY

## 2025-02-27 RX ORDER — INFLIXIMAB 100 MG/10ML
AS DIRECTED INJECTION, POWDER, LYOPHILIZED, FOR SOLUTION INTRAVENOUS
Status: ACTIVE | COMMUNITY
Start: 2024-09-26

## 2025-02-27 RX ORDER — INFLIXIMAB 100 MG/10ML
AS DIRECTED INJECTION, POWDER, LYOPHILIZED, FOR SOLUTION INTRAVENOUS
Qty: 1 | Refills: 6 | Status: ACTIVE | COMMUNITY
Start: 2024-09-26 | End: 2025-10-23

## 2025-04-09 ENCOUNTER — OFFICE VISIT (OUTPATIENT)
Dept: URBAN - METROPOLITAN AREA CLINIC 54 | Facility: CLINIC | Age: 55
End: 2025-04-09
Payer: OTHER GOVERNMENT

## 2025-04-09 DIAGNOSIS — K51.90 ULCERATIVE COLITIS WITHOUT COMPLICATIONS, UNSPECIFIED LOCATION: ICD-10-CM

## 2025-04-09 DIAGNOSIS — Z80.0 FAMILY HISTORY OF COLON CANCER IN FATHER: ICD-10-CM

## 2025-04-09 PROCEDURE — 99214 OFFICE O/P EST MOD 30 MIN: CPT | Performed by: PHYSICIAN ASSISTANT

## 2025-04-09 RX ORDER — MIRTAZAPINE 15 MG/1
1 TABLET AT BEDTIME TABLET, FILM COATED ORAL ONCE A DAY
Status: ACTIVE | COMMUNITY

## 2025-04-09 RX ORDER — INFLIXIMAB 100 MG/10ML
AS DIRECTED INJECTION, POWDER, LYOPHILIZED, FOR SOLUTION INTRAVENOUS
Status: ACTIVE | COMMUNITY
Start: 2024-09-26

## 2025-04-09 RX ORDER — ROPINIROLE HYDROCHLORIDE 1 MG/1
1 TABLET 1 TO 3 HOURS BEFORE BEDTIME TABLET, FILM COATED ORAL ONCE A DAY
Status: ACTIVE | COMMUNITY

## 2025-04-09 RX ORDER — POTASSIUM CITRATE 10 MEQ/1
1 TABLET WITH MEALS TABLET, EXTENDED RELEASE ORAL THREE TIMES A DAY
Status: ACTIVE | COMMUNITY

## 2025-04-09 RX ORDER — MESALAMINE 375 MG/1
4 CAPSULES IN THE MORNING CAPSULE, EXTENDED RELEASE ORAL ONCE A DAY
Status: ACTIVE | COMMUNITY

## 2025-04-09 RX ORDER — OMEPRAZOLE AND SODIUM BICARBONATE 40; 1100 MG/1; MG/1
1 CAPSULE ON AN EMPTY STOMACH CAPSULE ORAL ONCE A DAY
Status: ACTIVE | COMMUNITY

## 2025-04-09 RX ORDER — FERROUS SULFATE 325(65) MG
1 TABLET TABLET ORAL ONCE A DAY
Status: ACTIVE | COMMUNITY

## 2025-04-09 RX ORDER — PREDNISONE 20 MG/1
1 TABLET TABLET ORAL TWICE DAILY
Status: ON HOLD | COMMUNITY

## 2025-04-09 RX ORDER — OXYCODONE HYDROCHLORIDE AND ACETAMINOPHEN 5; 325 MG/1; MG/1
1 TABLET AS NEEDED TABLET ORAL
Status: ACTIVE | COMMUNITY

## 2025-04-09 RX ORDER — TAMSULOSIN HYDROCHLORIDE 0.4 MG/1
1 CAPSULE CAPSULE ORAL ONCE A DAY
Status: ACTIVE | COMMUNITY

## 2025-04-09 RX ORDER — INFLIXIMAB 100 MG/10ML
AS DIRECTED INJECTION, POWDER, LYOPHILIZED, FOR SOLUTION INTRAVENOUS
Qty: 1 | Refills: 6 | Status: ACTIVE | COMMUNITY
Start: 2024-09-26 | End: 2025-10-23

## 2025-04-09 RX ORDER — L. ACIDOPHILUS/L.BULGARICUS 1MM CELL
AS DIRECTED TABLET ORAL
Status: ACTIVE | COMMUNITY

## 2025-04-09 RX ORDER — ONDANSETRON 4 MG/1
1 TABLET ON THE TONGUE AND ALLOW TO DISSOLVE TABLET, ORALLY DISINTEGRATING ORAL ONCE A DAY
Status: ACTIVE | COMMUNITY

## 2025-04-09 NOTE — HPI-TODAY'S VISIT:
Venkat Oneil is a 53-year-old male patient with PMH of GERD, BPH, UC, who has [facility]. A copy of this document will be sent to the referring provider. Patient presents for uncontrolled UC. He was previous seen by [facility] where he failed Entyvio. He started Stelara on 5/2022 and received two maintenance doses until he lost [employer] insurance. He reports he has been trying to get Stelara approved with the [facility] since. He has been in and out of the ED since Jan 2024 for management of UC with steroids and remains on mesalamine 750 mg BID + budesonide 9mg. He complains of abdominal pain, rectal bleeding, and urgency. Work up during [facility] visit on 7/24 - 7/26 that showed anemia with hgb between 7-8 with iron def s/p IV iron transfusion, thrombocytosis, elevated CRP at 8.30. Negative GI pathogen profile with fecal ramón at >3000. Colonoscopy in 2010 with Dr. Olson for history of UC that noted pancolitis and normal-appearing ileum.  Biopsies showed mild active chronic colitis. Patient believes his last colonoscopy was ?one year ago.  CT A/P on 7/24/24 shows: CT findings of an acute, global, colitis with diffuse pericolonic inflammatory stranding and abnormal bowel wall thickening seen throughout the colon. These findings can be seen with infectious or inflammatory conditions.  Please exclude Clostridium as well as inflammatory bowel disease. No air or bowel wall pneumatosis is seen to suggest ischemiabut correlation with lactic acid levels is recommended.  No pericolonic abscess or other complicating features are identified. Gallstones remain in the contracted gallbladder. Hypodensities in the spleen and bilateral kidneys are unchanged from prior and probably reflect benign lesions such as cysts.  This can be correlated with abdominal sonography for assurance. Numerous bilateral nonobstructing subcentimeter renal stones are identified.  No other changes seen.  9/11/24: Patient presents for hospital follow up. Patient was seen in the ED 3 days ago at [facility] for RUQ pain. CT A/P on 9/9 that shows: 1. Findings most consistent with acute exacerbation of UC 2.Nonobstructing bilateral nephroliths. 3. Spleen contains soft tissue nodule with cystic center at the right margin of the spleen measuring 5.2 x 5 x 5.5 cm.  Finding is unchanged when compared to the exam from May 13, 2024.  Finding likely represents a benign etiology in the absence of known primary malignancy.  Recommend follow-up imaging to ensure stability in 6 months. 4. Cholelithiasis with no imaging findings of acute cholecystitis.   Pt was given 7 days of prednisone as well as cipro + flagyl. Normal WBC and procalcitonin. We attempted to send Stelara ourselves last OV, but this was denied. [facility]ed to call the [facility] during the [facility] and was on the phone for 22 mins and the line was disconnected.  10/16/24: Pt presents for routine follow up. Unable to get Stelara. Waiting for approval of Remicade and to start infusion. Tapering down on Prednisone. 3-4 BMs daily on average which was improved approx 6 BMs daily. Daily rectal bleeding. Good improvement in abdominal pain.  11/27/24: Patient presents for routine follow-up of ulcerative colitis.  Patient has tapered off of steroids and was seen in the ED twice.  Once for recurrent kidney stone and the second time was for UC flare.  He is currently on a 10-day course of prednisone.  Mild bleeding noted.  He has been approved for Remicade and there have been difficulty scheduling the patient.  4/9/25: Patient has completed steroid taper and induction of infliximab 5 mg/kg.  He has his first maintenance dose on 4/24. Patient had mild bleeding last week with 24 hour viral illness. Patient with approx 3-4 BMs daily. No abdominal pain.

## 2025-04-10 LAB
A/G RATIO: 1.5
ABSOLUTE BASOPHILS: 71
ABSOLUTE EOSINOPHILS: 205
ABSOLUTE LYMPHOCYTES: 1793
ABSOLUTE MONOCYTES: 624
ABSOLUTE NEUTROPHILS: 5206
ALBUMIN: 4.7
ALKALINE PHOSPHATASE: 80
ALT (SGPT): 12
AST (SGOT): 18
BASOPHILS: 0.9
BILIRUBIN, TOTAL: 0.5
BUN/CREATININE RATIO: (no result)
BUN: 9
CALCIUM: 9.9
CARBON DIOXIDE, TOTAL: 22
CHLORIDE: 104
CREATININE: 0.97
EGFR: 93
EOSINOPHILS: 2.6
FERRITIN, SERUM: 7
GLOBULIN, TOTAL: 3.1
GLUCOSE: 78
HEMATOCRIT: 41.7
HEMOGLOBIN: 12.6
IRON BIND.CAP.(TIBC): 383
IRON SATURATION: 6
IRON: 24
LYMPHOCYTES: 22.7
MCH: 23.6
MCHC: 30.2
MCV: 78.2
MONOCYTES: 7.9
MPV: 10.4
NEUTROPHILS: 65.9
PLATELET COUNT: 639
POTASSIUM: 3.8
PROTEIN, TOTAL: 7.8
RDW: 15.5
RED BLOOD CELL COUNT: 5.33
SODIUM: 140
WHITE BLOOD CELL COUNT: 7.9

## 2025-04-11 ENCOUNTER — TELEPHONE ENCOUNTER (OUTPATIENT)
Dept: URBAN - METROPOLITAN AREA CLINIC 54 | Facility: CLINIC | Age: 55
End: 2025-04-11

## 2025-04-11 PROBLEM — 724556004: Status: ACTIVE | Noted: 2025-04-11

## 2025-04-11 RX ORDER — FERRIC CARBOXYMALTOSE INJECTION 50 MG/ML
AS DIRECTED INJECTION, SOLUTION INTRAVENOUS
OUTPATIENT
Start: 2025-04-11 | End: 2025-04-25

## 2025-04-24 ENCOUNTER — OFFICE VISIT (OUTPATIENT)
Dept: URBAN - METROPOLITAN AREA CLINIC 53 | Facility: CLINIC | Age: 55
End: 2025-04-24
Payer: OTHER GOVERNMENT

## 2025-04-24 DIAGNOSIS — K51.90 ULCERATIVE COLITIS WITHOUT COMPLICATIONS, UNSPECIFIED LOCATION: ICD-10-CM

## 2025-04-24 PROCEDURE — 96415 CHEMO IV INFUSION ADDL HR: CPT | Performed by: INTERNAL MEDICINE

## 2025-04-24 PROCEDURE — 96413 CHEMO IV INFUSION 1 HR: CPT | Performed by: INTERNAL MEDICINE

## 2025-04-24 RX ORDER — MESALAMINE 375 MG/1
4 CAPSULES IN THE MORNING CAPSULE, EXTENDED RELEASE ORAL ONCE A DAY
Status: ACTIVE | COMMUNITY

## 2025-04-24 RX ORDER — FERROUS SULFATE 325(65) MG
1 TABLET TABLET ORAL ONCE A DAY
Status: ACTIVE | COMMUNITY

## 2025-04-24 RX ORDER — INFLIXIMAB 100 MG/10ML
AS DIRECTED INJECTION, POWDER, LYOPHILIZED, FOR SOLUTION INTRAVENOUS
Status: ACTIVE | COMMUNITY
Start: 2024-09-26

## 2025-04-24 RX ORDER — ONDANSETRON 4 MG/1
1 TABLET ON THE TONGUE AND ALLOW TO DISSOLVE TABLET, ORALLY DISINTEGRATING ORAL ONCE A DAY
Status: ACTIVE | COMMUNITY

## 2025-04-24 RX ORDER — FERRIC CARBOXYMALTOSE INJECTION 50 MG/ML
AS DIRECTED INJECTION, SOLUTION INTRAVENOUS
Status: ACTIVE | COMMUNITY
Start: 2025-04-11 | End: 2025-04-25

## 2025-04-24 RX ORDER — MIRTAZAPINE 15 MG/1
1 TABLET AT BEDTIME TABLET, FILM COATED ORAL ONCE A DAY
Status: ACTIVE | COMMUNITY

## 2025-04-24 RX ORDER — POTASSIUM CITRATE 10 MEQ/1
1 TABLET WITH MEALS TABLET, EXTENDED RELEASE ORAL THREE TIMES A DAY
Status: ACTIVE | COMMUNITY

## 2025-04-24 RX ORDER — ROPINIROLE HYDROCHLORIDE 1 MG/1
1 TABLET 1 TO 3 HOURS BEFORE BEDTIME TABLET, FILM COATED ORAL ONCE A DAY
Status: ACTIVE | COMMUNITY

## 2025-04-24 RX ORDER — TAMSULOSIN HYDROCHLORIDE 0.4 MG/1
1 CAPSULE CAPSULE ORAL ONCE A DAY
Status: ACTIVE | COMMUNITY

## 2025-04-24 RX ORDER — L. ACIDOPHILUS/L.BULGARICUS 1MM CELL
AS DIRECTED TABLET ORAL
Status: ACTIVE | COMMUNITY

## 2025-04-24 RX ORDER — PREDNISONE 20 MG/1
1 TABLET TABLET ORAL TWICE DAILY
Status: ON HOLD | COMMUNITY

## 2025-04-24 RX ORDER — OXYCODONE HYDROCHLORIDE AND ACETAMINOPHEN 5; 325 MG/1; MG/1
1 TABLET AS NEEDED TABLET ORAL
Status: ACTIVE | COMMUNITY

## 2025-04-24 RX ORDER — OMEPRAZOLE AND SODIUM BICARBONATE 40; 1100 MG/1; MG/1
1 CAPSULE ON AN EMPTY STOMACH CAPSULE ORAL ONCE A DAY
Status: ACTIVE | COMMUNITY

## 2025-04-24 RX ORDER — INFLIXIMAB 100 MG/10ML
AS DIRECTED INJECTION, POWDER, LYOPHILIZED, FOR SOLUTION INTRAVENOUS
Qty: 1 | Refills: 6 | Status: ACTIVE | COMMUNITY
Start: 2024-09-26 | End: 2025-10-23

## 2025-04-25 ENCOUNTER — TELEPHONE ENCOUNTER (OUTPATIENT)
Dept: URBAN - METROPOLITAN AREA CLINIC 97 | Facility: CLINIC | Age: 55
End: 2025-04-25

## 2025-05-06 ENCOUNTER — TELEPHONE ENCOUNTER (OUTPATIENT)
Dept: URBAN - METROPOLITAN AREA CLINIC 82 | Facility: CLINIC | Age: 55
End: 2025-05-06

## 2025-06-19 ENCOUNTER — OFFICE VISIT (OUTPATIENT)
Dept: URBAN - METROPOLITAN AREA CLINIC 53 | Facility: CLINIC | Age: 55
End: 2025-06-19
Payer: OTHER GOVERNMENT

## 2025-06-19 DIAGNOSIS — K51.90 ULCERATIVE COLITIS WITHOUT COMPLICATIONS, UNSPECIFIED LOCATION: ICD-10-CM

## 2025-06-19 PROCEDURE — 96413 CHEMO IV INFUSION 1 HR: CPT | Performed by: INTERNAL MEDICINE

## 2025-06-19 PROCEDURE — 96415 CHEMO IV INFUSION ADDL HR: CPT | Performed by: INTERNAL MEDICINE

## 2025-06-19 RX ORDER — MIRTAZAPINE 15 MG/1
1 TABLET AT BEDTIME TABLET, FILM COATED ORAL ONCE A DAY
Status: ACTIVE | COMMUNITY

## 2025-06-19 RX ORDER — INFLIXIMAB 100 MG/10ML
AS DIRECTED INJECTION, POWDER, LYOPHILIZED, FOR SOLUTION INTRAVENOUS
Status: ACTIVE | COMMUNITY
Start: 2024-09-26

## 2025-06-19 RX ORDER — MESALAMINE 375 MG/1
4 CAPSULES IN THE MORNING CAPSULE, EXTENDED RELEASE ORAL ONCE A DAY
Status: ACTIVE | COMMUNITY

## 2025-06-19 RX ORDER — ONDANSETRON 4 MG/1
1 TABLET ON THE TONGUE AND ALLOW TO DISSOLVE TABLET, ORALLY DISINTEGRATING ORAL ONCE A DAY
Status: ACTIVE | COMMUNITY

## 2025-06-19 RX ORDER — L. ACIDOPHILUS/L.BULGARICUS 1MM CELL
AS DIRECTED TABLET ORAL
Status: ACTIVE | COMMUNITY

## 2025-06-19 RX ORDER — FERROUS SULFATE 325(65) MG
1 TABLET TABLET ORAL ONCE A DAY
Status: ACTIVE | COMMUNITY

## 2025-06-19 RX ORDER — TAMSULOSIN HYDROCHLORIDE 0.4 MG/1
1 CAPSULE CAPSULE ORAL ONCE A DAY
Status: ACTIVE | COMMUNITY

## 2025-06-19 RX ORDER — OXYCODONE HYDROCHLORIDE AND ACETAMINOPHEN 5; 325 MG/1; MG/1
1 TABLET AS NEEDED TABLET ORAL
Status: ACTIVE | COMMUNITY

## 2025-06-19 RX ORDER — PREDNISONE 20 MG/1
1 TABLET TABLET ORAL TWICE DAILY
Status: ON HOLD | COMMUNITY

## 2025-06-19 RX ORDER — ROPINIROLE HYDROCHLORIDE 1 MG/1
1 TABLET 1 TO 3 HOURS BEFORE BEDTIME TABLET, FILM COATED ORAL ONCE A DAY
Status: ACTIVE | COMMUNITY

## 2025-06-19 RX ORDER — INFLIXIMAB 100 MG/10ML
AS DIRECTED INJECTION, POWDER, LYOPHILIZED, FOR SOLUTION INTRAVENOUS
Qty: 1 | Refills: 6 | Status: ACTIVE | COMMUNITY
Start: 2024-09-26 | End: 2025-10-23

## 2025-06-19 RX ORDER — OMEPRAZOLE AND SODIUM BICARBONATE 40; 1100 MG/1; MG/1
1 CAPSULE ON AN EMPTY STOMACH CAPSULE ORAL ONCE A DAY
Status: ACTIVE | COMMUNITY

## 2025-06-19 RX ORDER — POTASSIUM CITRATE 10 MEQ/1
1 TABLET WITH MEALS TABLET, EXTENDED RELEASE ORAL THREE TIMES A DAY
Status: ACTIVE | COMMUNITY

## 2025-06-20 ENCOUNTER — TELEPHONE ENCOUNTER (OUTPATIENT)
Dept: URBAN - METROPOLITAN AREA CLINIC 82 | Facility: CLINIC | Age: 55
End: 2025-06-20

## 2025-06-30 ENCOUNTER — TELEPHONE ENCOUNTER (OUTPATIENT)
Dept: URBAN - METROPOLITAN AREA CLINIC 82 | Facility: CLINIC | Age: 55
End: 2025-06-30

## 2025-07-14 ENCOUNTER — TELEPHONE ENCOUNTER (OUTPATIENT)
Dept: URBAN - METROPOLITAN AREA CLINIC 54 | Facility: CLINIC | Age: 55
End: 2025-07-14

## 2025-08-14 ENCOUNTER — TELEPHONE ENCOUNTER (OUTPATIENT)
Dept: URBAN - METROPOLITAN AREA CLINIC 23 | Facility: CLINIC | Age: 55
End: 2025-08-14

## 2025-08-14 ENCOUNTER — OFFICE VISIT (OUTPATIENT)
Dept: URBAN - METROPOLITAN AREA CLINIC 53 | Facility: CLINIC | Age: 55
End: 2025-08-14
Payer: OTHER GOVERNMENT

## 2025-08-14 DIAGNOSIS — K51.90 ULCERATIVE COLITIS WITHOUT COMPLICATIONS, UNSPECIFIED LOCATION: ICD-10-CM

## 2025-08-14 PROCEDURE — 96415 CHEMO IV INFUSION ADDL HR: CPT | Performed by: INTERNAL MEDICINE

## 2025-08-14 PROCEDURE — 96413 CHEMO IV INFUSION 1 HR: CPT | Performed by: INTERNAL MEDICINE

## 2025-08-14 RX ORDER — INFLIXIMAB 100 MG/10ML
AS DIRECTED INJECTION, POWDER, LYOPHILIZED, FOR SOLUTION INTRAVENOUS
Qty: 1 | Refills: 6 | Status: ACTIVE | COMMUNITY
Start: 2024-09-26 | End: 2025-10-23

## 2025-08-14 RX ORDER — INFLIXIMAB 100 MG/10ML
AS DIRECTED INJECTION, POWDER, LYOPHILIZED, FOR SOLUTION INTRAVENOUS
Qty: 1 | Refills: 6
Start: 2024-09-26 | End: 2026-09-11

## 2025-08-14 RX ORDER — MESALAMINE 375 MG/1
4 CAPSULES IN THE MORNING CAPSULE, EXTENDED RELEASE ORAL ONCE A DAY
Status: ACTIVE | COMMUNITY

## 2025-08-14 RX ORDER — TAMSULOSIN HYDROCHLORIDE 0.4 MG/1
1 CAPSULE CAPSULE ORAL ONCE A DAY
Status: ACTIVE | COMMUNITY

## 2025-08-14 RX ORDER — PREDNISONE 20 MG/1
1 TABLET TABLET ORAL TWICE DAILY
Status: ON HOLD | COMMUNITY

## 2025-08-14 RX ORDER — FERROUS SULFATE 325(65) MG
1 TABLET TABLET ORAL ONCE A DAY
Status: ACTIVE | COMMUNITY

## 2025-08-14 RX ORDER — L. ACIDOPHILUS/L.BULGARICUS 1MM CELL
AS DIRECTED TABLET ORAL
Status: ACTIVE | COMMUNITY

## 2025-08-14 RX ORDER — POTASSIUM CITRATE 10 MEQ/1
1 TABLET WITH MEALS TABLET, EXTENDED RELEASE ORAL THREE TIMES A DAY
Status: ACTIVE | COMMUNITY

## 2025-08-14 RX ORDER — MIRTAZAPINE 15 MG/1
1 TABLET AT BEDTIME TABLET, FILM COATED ORAL ONCE A DAY
Status: ACTIVE | COMMUNITY

## 2025-08-14 RX ORDER — OXYCODONE HYDROCHLORIDE AND ACETAMINOPHEN 5; 325 MG/1; MG/1
1 TABLET AS NEEDED TABLET ORAL
Status: ACTIVE | COMMUNITY

## 2025-08-14 RX ORDER — ROPINIROLE HYDROCHLORIDE 1 MG/1
1 TABLET 1 TO 3 HOURS BEFORE BEDTIME TABLET, FILM COATED ORAL ONCE A DAY
Status: ACTIVE | COMMUNITY

## 2025-08-14 RX ORDER — OMEPRAZOLE AND SODIUM BICARBONATE 40; 1100 MG/1; MG/1
1 CAPSULE ON AN EMPTY STOMACH CAPSULE ORAL ONCE A DAY
Status: ACTIVE | COMMUNITY

## 2025-08-14 RX ORDER — INFLIXIMAB 100 MG/10ML
AS DIRECTED INJECTION, POWDER, LYOPHILIZED, FOR SOLUTION INTRAVENOUS
Status: ACTIVE | COMMUNITY
Start: 2024-09-26

## 2025-08-14 RX ORDER — ONDANSETRON 4 MG/1
1 TABLET ON THE TONGUE AND ALLOW TO DISSOLVE TABLET, ORALLY DISINTEGRATING ORAL ONCE A DAY
Status: ACTIVE | COMMUNITY

## 2025-08-21 ENCOUNTER — TELEPHONE ENCOUNTER (OUTPATIENT)
Dept: URBAN - METROPOLITAN AREA CLINIC 23 | Facility: CLINIC | Age: 55
End: 2025-08-21

## 2025-08-26 ENCOUNTER — TELEPHONE ENCOUNTER (OUTPATIENT)
Dept: URBAN - METROPOLITAN AREA CLINIC 82 | Facility: CLINIC | Age: 55
End: 2025-08-26

## 2025-08-26 RX ORDER — DICYCLOMINE HYDROCHLORIDE 20 MG/1
1 TABLET TABLET ORAL THREE TIMES A DAY
Qty: 90 | Refills: 3 | OUTPATIENT
Start: 2025-08-26

## 2025-08-27 ENCOUNTER — TELEPHONE ENCOUNTER (OUTPATIENT)
Dept: URBAN - METROPOLITAN AREA CLINIC 82 | Facility: CLINIC | Age: 55
End: 2025-08-27

## 2025-08-27 ENCOUNTER — OFFICE VISIT (OUTPATIENT)
Dept: URBAN - METROPOLITAN AREA CLINIC 54 | Facility: CLINIC | Age: 55
End: 2025-08-27

## 2025-08-27 RX ORDER — ONDANSETRON 4 MG/1
1 TABLET ON THE TONGUE AND ALLOW TO DISSOLVE TABLET, ORALLY DISINTEGRATING ORAL ONCE A DAY
Status: ACTIVE | COMMUNITY

## 2025-08-27 RX ORDER — MESALAMINE 375 MG/1
4 CAPSULES IN THE MORNING CAPSULE, EXTENDED RELEASE ORAL ONCE A DAY
Status: ACTIVE | COMMUNITY

## 2025-08-27 RX ORDER — FERROUS SULFATE 325(65) MG
1 TABLET TABLET ORAL ONCE A DAY
Status: ACTIVE | COMMUNITY

## 2025-08-27 RX ORDER — INFLIXIMAB 100 MG/10ML
AS DIRECTED INJECTION, POWDER, LYOPHILIZED, FOR SOLUTION INTRAVENOUS
Qty: 1 | Refills: 6 | Status: ACTIVE | COMMUNITY
Start: 2024-09-26 | End: 2026-09-11

## 2025-08-27 RX ORDER — PREDNISONE 20 MG/1
1 TABLET TABLET ORAL TWICE DAILY
Status: ACTIVE | COMMUNITY

## 2025-08-27 RX ORDER — ROPINIROLE HYDROCHLORIDE 1 MG/1
1 TABLET 1 TO 3 HOURS BEFORE BEDTIME TABLET, FILM COATED ORAL ONCE A DAY
Status: ACTIVE | COMMUNITY

## 2025-08-27 RX ORDER — DICYCLOMINE HYDROCHLORIDE 20 MG/1
1 TABLET TABLET ORAL THREE TIMES A DAY
Qty: 90 | Refills: 3
Start: 2025-08-26

## 2025-08-27 RX ORDER — BUPROPION HYDROCHLORIDE 150 MG/1
1 TABLET IN THE MORNING TABLET, FILM COATED, EXTENDED RELEASE ORAL ONCE A DAY
Status: ACTIVE | COMMUNITY

## 2025-08-27 RX ORDER — INFLIXIMAB 100 MG/10ML
AS DIRECTED INJECTION, POWDER, LYOPHILIZED, FOR SOLUTION INTRAVENOUS
Status: ACTIVE | COMMUNITY
Start: 2024-09-26

## 2025-08-27 RX ORDER — OXYCODONE HYDROCHLORIDE AND ACETAMINOPHEN 5; 325 MG/1; MG/1
1 TABLET AS NEEDED TABLET ORAL
Status: ACTIVE | COMMUNITY

## 2025-08-27 RX ORDER — POTASSIUM CITRATE 10 MEQ/1
1 TABLET WITH MEALS TABLET, EXTENDED RELEASE ORAL THREE TIMES A DAY
Status: ACTIVE | COMMUNITY

## 2025-08-27 RX ORDER — TAMSULOSIN HYDROCHLORIDE 0.4 MG/1
1 CAPSULE CAPSULE ORAL ONCE A DAY
Status: ACTIVE | COMMUNITY

## 2025-08-27 RX ORDER — OMEPRAZOLE AND SODIUM BICARBONATE 40; 1100 MG/1; MG/1
1 CAPSULE ON AN EMPTY STOMACH CAPSULE ORAL ONCE A DAY
Status: ACTIVE | COMMUNITY

## 2025-08-27 RX ORDER — BUDESONIDE 3 MG/1
1 CAPSULE CAPSULE ORAL
Status: ACTIVE | COMMUNITY